# Patient Record
(demographics unavailable — no encounter records)

---

## 2025-04-14 NOTE — ADDENDUM
[FreeTextEntry1] : This note was written by Bruce Guzman on 04/14/2025 acting as scribe for Dr. Bryan Guzmán M.D.  I, Bryan Guzmán MD, have read and attest that all the information, medical decision making and discharge instructions within are true and accurate.

## 2025-04-14 NOTE — HISTORY OF PRESENT ILLNESS
[Stable] : stable [de-identified] : 58 year old LHD female presents for initial evaluation of lower back pain  The pain is mostly across the lower back  She has intermittent radicular symptoms down the left leg. She is taking Advil Duo for the pain with mild relief.  She has not tried PT, acupuncture, chiropractic care and LESI recently.  She is s/p lumbar laminectomy L4-L5 with Dr. Sullivan on 12/6/2011 She has been having chronic neck pain The pain is mostly at the right side of the neck, at the periscapular area and down the arm.  She has numbness/tingling of the right arm  She had a massage on Saturday which did not provide relief  She notes weakness of the right arm/hand She has tried PT, acupuncture, stretch therapy, [facility]ERNON x2 in 2021 and 2022 which provided no relief.  She uses medical marijuana to help sleep  She had seen Dr. Pedroza in the past and had an MRI of the cervical spine in 2020  No fever, chills, sweats, nausea/vomiting. No bowel or bladder dysfunction, no recent weight loss or gain. No night pain. This history is in addition to the intake form that I personally reviewed.

## 2025-04-14 NOTE — PHYSICAL EXAM
[Normal] : Gait: normal [Freeman's Sign] : negative Freeman's sign [Pronator Drift] : negative pronator drift [SLR] : negative straight leg raise [de-identified] : 5 out of 5 motor strength, sensation is intact and symmetrical full range of motion flexion extension and rotation, no palpatory tenderness full range of motion of hips knees shoulders and elbows (all four extremities), no atrophy, negative straight leg raise, no pathological reflexes, no swelling, normal ambulation, no apparent distress skin is intact, no palpable lymph nodes, no upper or lower extremity instability, alert and oriented x3 and normal mood. Normal finger-to nose test.  No upper motor neuron findings. Pain with lumbar flexion. Pain with cervical extension/flexion. [de-identified] : XR AP Lat Lumbar 04/14/2025 -L5-S1 narrowing- reviewed with the patient.  XR AP Lat Cervical 04/14/2025 -C5-6 narrowing- reviewed with the patient.  I reviewed, interpreted and clinically correlated the following outside imaging studies, EXAM: MR SPINE CERVICAL  PROCEDURE DATE: 11/16/2020  INTERPRETATION: Clinical indication: Extreme neck and shoulder pain.  Comparison: None.  Technique: MRI of the cervical spine. Intravenous contrast: None.  FINDINGS:  There is a minimal reversal of the cervical lordosis centered at C3-C4. There is moderate disc height loss posteriorly at C3-C4 with moderate to severe endplate edema. The heights of the vertebral bodies are preserved. The cord signal is normal. The cervical medullary junction appears unremarkable.  The findings at the following levels are:  C1-C2: Normal.  C2-3: Normal.  C3-4: Minimal left paracentral disc osteophyte protrusion which partially effaces the ventral thecal sac.  C4-5: Left paracentral annular fissure with a mild left paracentral disc protrusion which contacts the left ventral cord without effacing the posterior thecal sac.  C5-6: Minimal disc bulge and minimal bilateral uncovertebral arthropathy.  C6-7: Normal.  C7-T1: Normal.  Impression: Minimal reversal of the cervical lordosis at C3-C4 with moderate disc height loss posteriorly moderate to severe endplate edema.  Left paracentral annular fissure at L4-5 with a mild left paracentral disc protrusion which contacts the left ventral cord without effacing the posterior thecal sac.  DINESH SHETTY MD; Attending Radiologist This document has been electronically signed. Nov 17 2020 5:56PM

## 2025-04-14 NOTE — DISCUSSION/SUMMARY
[de-identified] : L5-S1 disc degenerative disease. C5-6 disc degenerative disease. Discussed all options. Diclofenac PRN. Referral for physical therapy. If no better in 2-3 weeks, will obtain MRI. All options discussed including rest, medicine, home exercise, acupuncture, Chiropractic care, Physical Therapy, Pain management, and last resort surgery. All questions were answered, all alternatives discussed, and the patient is in complete agreement with the treatment plan which the patient contributed to and discussed with me through the shared decision-making process. Follow-up appointment as instructed. Any issues and the patient will call or come in sooner.  agrees with plan.